# Patient Record
Sex: MALE | URBAN - METROPOLITAN AREA
[De-identification: names, ages, dates, MRNs, and addresses within clinical notes are randomized per-mention and may not be internally consistent; named-entity substitution may affect disease eponyms.]

---

## 2017-04-14 ENCOUNTER — IMPORTED ENCOUNTER (OUTPATIENT)
Dept: URBAN - METROPOLITAN AREA CLINIC 50 | Facility: CLINIC | Age: 72
End: 2017-04-14

## 2017-05-12 ENCOUNTER — IMPORTED ENCOUNTER (OUTPATIENT)
Dept: URBAN - METROPOLITAN AREA CLINIC 50 | Facility: CLINIC | Age: 72
End: 2017-05-12

## 2017-07-07 ENCOUNTER — IMPORTED ENCOUNTER (OUTPATIENT)
Dept: URBAN - METROPOLITAN AREA CLINIC 50 | Facility: CLINIC | Age: 72
End: 2017-07-07

## 2017-08-11 ENCOUNTER — IMPORTED ENCOUNTER (OUTPATIENT)
Dept: URBAN - METROPOLITAN AREA CLINIC 50 | Facility: CLINIC | Age: 72
End: 2017-08-11

## 2017-08-18 ENCOUNTER — IMPORTED ENCOUNTER (OUTPATIENT)
Dept: URBAN - METROPOLITAN AREA CLINIC 50 | Facility: CLINIC | Age: 72
End: 2017-08-18

## 2017-10-19 ENCOUNTER — IMPORTED ENCOUNTER (OUTPATIENT)
Dept: URBAN - METROPOLITAN AREA CLINIC 50 | Facility: CLINIC | Age: 72
End: 2017-10-19

## 2017-11-03 ENCOUNTER — IMPORTED ENCOUNTER (OUTPATIENT)
Dept: URBAN - METROPOLITAN AREA CLINIC 50 | Facility: CLINIC | Age: 72
End: 2017-11-03

## 2017-11-10 ENCOUNTER — IMPORTED ENCOUNTER (OUTPATIENT)
Dept: URBAN - METROPOLITAN AREA CLINIC 50 | Facility: CLINIC | Age: 72
End: 2017-11-10

## 2017-11-30 ENCOUNTER — IMPORTED ENCOUNTER (OUTPATIENT)
Dept: URBAN - METROPOLITAN AREA CLINIC 50 | Facility: CLINIC | Age: 72
End: 2017-11-30

## 2018-01-30 ENCOUNTER — IMPORTED ENCOUNTER (OUTPATIENT)
Dept: URBAN - METROPOLITAN AREA CLINIC 50 | Facility: CLINIC | Age: 73
End: 2018-01-30

## 2021-04-23 ASSESSMENT — TONOMETRY
OS_IOP_MMHG: 16
OS_IOP_MMHG: 16
OD_IOP_MMHG: 16
OD_IOP_MMHG: 18

## 2021-04-23 ASSESSMENT — VISUAL ACUITY
OS_CC: J1+@ 16 IN
OD_CC: J1+@ 16 IN
OS_CC: 20/25
OS_CC: 20/25+2
OS_SC: 20/30-
OD_CC: 20/20
OD_SC: 20/50
OD_CC: 20/25
OS_PH: 20/25
OS_SC: 20/30
OD_SC: 20/50
OD_PH: 20/30

## 2021-06-08 NOTE — PATIENT DISCUSSION
very visually significant cataract OD greater than OS along with retinopathy OU. Referred for cataract surgery consultation.  Patient lives in [facility].

## 2021-06-08 NOTE — PATIENT DISCUSSION
mission cataract MD consultation for opinion on management of very dense cataract and nonspecified diabetic retinopathy OU.

## 2021-06-08 NOTE — PATIENT DISCUSSION
attempted OCT  possible macular edema observed OS versus artifact, macular scan OD poor quality but more normal. poor scan quality from cataracts OU.

## 2021-07-16 NOTE — PATIENT DISCUSSION
PT SHOWS DIABETIC RETINOPATHY AND MACULAR EDEMA. PT IS NOT SEEING A RETINA SPECIALIST . CATARACT SURGERY INCREASES RISKS OF ADDITONAL SWELLING ECT  AND CONCERNED HE MAY NOT HAVE THE FUNDS TO BE FOLLOWED AND TREATED AFTER.  PT WILL  NEED RETINAL CLEARANCE PRIOR TO CAT SX.

## 2021-07-16 NOTE — PATIENT DISCUSSION
Larissa Lazaro is a 77year old female who presents for a Medicare Annual Wellness visit.     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 days, 10mg po for 3 days, Disp: 18 tablet Rfl: 0   Albuterol Sulfate HFA (PROAIR HFA) 108 (90 BASE) MCG/ACT Inhalation Aero Soln Inhale 2 puffs into the lungs every 4 (four) hours as needed for Wheezing or Shortness of Breath (cough).  Disp: 1 Inhaler Rfl: Walks    How would you describe your daily physical activity?: Moderate    How would you describe your current health state?: Good    How do you maintain positive mental well-being?: Puzzles;Games; Visiting Friends; Visiting Family; Social Interaction    If y Assessment\" under Medicare Assessment section in Charting, test patient and document. Then, refresh your progress note to see your input here.   Cognitive Assessment     What day of the week is this?: Correct    What month is it?: Correct    What year i Mammogram  Annually Mammogram,1 Yr due on 12/18/2016 Update Health Maintenance if applicable   Immunizations      Influenza No orders found for this or any previous visit.  Update Immunization Activity if applicable    Pneumococcal   Orders placed or per No results found for this or any previous visit. No flowsheet data found.         ALLERGIES:     Benadryl [Diphenhyd*        Comment:Rapid heart rate  Heparin                     Comment:Blood clot  Penicillins             Hives    CURRENT MEDICATIONS: Smokeless tobacco: Never Used                      Alcohol use:  No              Occ: retired : y       REVIEW OF SYSTEMS:   GENERAL: feels well otherwise  SKIN: denies any unusual skin lesions  EYES: denies blurred vision or double vaccination  -     FLU VACCINE ADJUVANT IM    Essential hypertension- controlled, cpm    Pure hypercholesterolemia- check labs today, on atorvastatin    Status post resection of meningioma/seizure disorder-  f/u neurosurgeon from [place], on keppra      H/

## 2021-07-27 NOTE — PATIENT DISCUSSION
Long discussion with patient, will require long term care, recommend patient be seen at [facility] for retina care or [facility], information given to patient.  Stressed importance of follow up care.

## 2021-07-27 NOTE — PATIENT DISCUSSION
Discussed the importance of blood pressure control in the prevention of ocular complications.  Stressed importance of follow up care.

## 2021-08-31 NOTE — PROCEDURE NOTE: CLINICAL
PROCEDURE NOTE: Lucentis 0.3mg Sample OD. Diagnosis: Diabetic Macular Edema. Anesthesia: Topical. Prep: Betadine Drops and Betadine Scrub. The patient was identified visually and verbally by the surgeon. The procedure eye was marked with an adhesive arrow sticker. The risks, benefits, alternatives and possible complications of the procedure were discussed with the patient and informed consent was obtained. The patient was positioned in the chair. After numerous topical anesthetic drops were placed, subconjunctival lidocaine was administered. A speculum was used to hold the eyelid open. A caliper was used to marked the site of injection 3.5-4mm from the limbus. Betadine was placed on the site with a swab and allowed to sit for thirty seconds. A sterile 30 or 31 guage needle with the medication entered the vitreous cavity and the medication was administered. The speculum was removed. The eye was copiously rinsed with saline to remove all betadine, especially from the fornices. Gross vision was assessed. If the patient wished an antibiotic ointment and eye patch were applied. The patient was instructed to call immediately if any significant visual loss, swelling, discharge, or pain occurred. Intravitreal injection of Lucentis 0.3mg/0.05 ml was given. Patient tolerated the procedure well. There were no complications. CF vision checked. Post procedure instructions given. Nareshbubba Jeannette PROCEDURE NOTE: Avastin () OS. Diagnosis: Diabetic Macular Edema. Anesthesia: Proparacaine 0.5%. Prep: Betadine Drops and Betadine Scrub. Prior to the original injection, risks/benefits/alternatives discussed including infection, loss of vision, hemorrhage, cataract, glaucoma, retinal tears or detachment. A written consent is on file, and the need for today’s injection was discussed and the patient is understanding and wishes to proceed. The off-label status of Intravitreal Avastin also was reviewed. The patient wished to proceed with treatment. The patient wished to proceed with treatment. Topical anesthetic drops were applied to the eye. Betadine prep was performed. Surgical mask worn. Sterile drape and lid speculum were applied. Using the syringe provided, Avastin 1.25 mg in 0.05 cc was injected into the vitreous cavity. Injection site: 3-4 mm from the limbus. Patient tolerated procedure well. Following the intravitreal injection, the sterile lid speculum was removed. CRA perfusion confirmed. CF vision checked. Patient given office phone number/answering service number and advised to call immediately should there be an increase in floaters or redness, loss of vision or pain, or should they have any other questions or concerns. Angelita Machado

## 2021-09-10 NOTE — PROCEDURE NOTE: CLINICAL
PROCEDURE NOTE: PRP OD. Diagnosis: Diabetic Macular Edema. Anesthesia: Topical. Prior to PRP, risks/benefits/alternatives to laser discussed including loss of vision, decreased peripheral and night vision and patient wished to proceed. Spot size: 100 um. Power: 600 mW. Number of pulses: 125. Patient tolerated procedure well. There were no complications. Post procedure instructions given. Neelima Quinteros

## 2021-09-28 NOTE — PROCEDURE NOTE: CLINICAL
PROCEDURE NOTE: Avastin () OD. Diagnosis: Diabetic Macular Edema. Anesthesia: Proparacaine 0.5%. Prep: Betadine Drops and Betadine Scrub. Prior to the original injection, risks/benefits/alternatives discussed including infection, loss of vision, hemorrhage, cataract, glaucoma, retinal tears or detachment. A written consent is on file, and the need for today’s injection was discussed and the patient is understanding and wishes to proceed. The off-label status of Intravitreal Avastin also was reviewed. The patient wished to proceed with treatment. The patient wished to proceed with treatment. Topical anesthetic drops were applied to the eye. Betadine prep was performed. Surgical mask worn. Sterile drape and lid speculum were applied. Using the syringe provided, Avastin 1.25 mg in 0.05 cc was injected into the vitreous cavity. Injection site: 3-4 mm from the limbus. Patient tolerated procedure well. Following the intravitreal injection, the sterile lid speculum was removed. CRA perfusion confirmed. CF vision checked. Patient given office phone number/answering service number and advised to call immediately should there be an increase in floaters or redness, loss of vision or pain, or should they have any other questions or concerns. [facility] PROCEDURE NOTE: Avastin () OS. Diagnosis: Diabetic Macular Edema. Anesthesia: Proparacaine 0.5%. Prep: Betadine Drops and Betadine Scrub. Prior to the original injection, risks/benefits/alternatives discussed including infection, loss of vision, hemorrhage, cataract, glaucoma, retinal tears or detachment. A written consent is on file, and the need for today’s injection was discussed and the patient is understanding and wishes to proceed. The off-label status of Intravitreal Avastin also was reviewed. The patient wished to proceed with treatment. The patient wished to proceed with treatment. Topical anesthetic drops were applied to the eye. Betadine prep was performed. Surgical mask worn. Sterile drape and lid speculum were applied. Using the syringe provided, Avastin 1.25 mg in 0.05 cc was injected into the vitreous cavity. Injection site: 3-4 mm from the limbus. Patient tolerated procedure well. Following the intravitreal injection, the sterile lid speculum was removed. CRA perfusion confirmed. CF vision checked. Patient given office phone number/answering service number and advised to call immediately should there be an increase in floaters or redness, loss of vision or pain, or should they have any other questions or concerns. Waqas Lutz

## 2021-10-05 NOTE — PATIENT DISCUSSION
The patient feels that the cataract is significantly impacting daily activities and has elected cataract surgery. The risks, benefits, and alternatives to surgery were discussed. The patient elects to proceed with surgery. Pt understands that he has limited visual potential due to retinal issueS. Will contact.

## 2021-10-05 NOTE — PATIENT DISCUSSION
PT HAS CONTACTED [facility] [phone] SHERYL SCHMIDT AND THEY WILL COVER ALL COSTS OF CATARACT SURGERY, INCLUDING PO'S AND NEW GLASSES.

## 2021-10-18 NOTE — PATIENT DISCUSSION
PT HAS CONTACTED [facility] [phone] SHERYL SCHMIDT AND THEY WILL COVER ALL COSTS OF CATARACT SURGERY, INCLUDING PO'S AND NEW GLASSES.

## 2021-10-18 NOTE — PATIENT DISCUSSION
PT HAS CONTACTED [facility] [phone] SHERYL SCHMIDT AND THEY WILL COVER ALL COSTS OF CATARACT SURGERY, INCLUDING PO'S AND NEW GLASSES.

## 2021-10-19 NOTE — PATIENT DISCUSSION
Cataract surgery has been performed in the first eye and activities of daily living are still impaired. The patient would like to proceed with cataract surgery in the second eye as scheduled. The patient elects BV OS, goal of emmetropia.

## 2021-10-19 NOTE — PATIENT DISCUSSION
PT HAS CONTACTED [facility] [phone] SHERYL SCHMIDT AND THEY WILL COVER ALL COSTS OF CATARACT SURGERY, INCLUDING PO'S AND NEW GLASSES.

## 2021-10-25 NOTE — PATIENT DISCUSSION
1week PO: Patient is doing well post-operatively. The importance of post-op drop compliance was emphasized. Drop schedule reviewed with patient. Patient to call if any visual changes or concerns.

## 2021-10-25 NOTE — PATIENT DISCUSSION
PT HAS CONTACTED [facility] [phone] SHERYL SCHMIDT AND THEY WILL COVER ALL COSTS OF CATARACT SURGERY, INCLUDING PO'S AND NEW GLASSES.

## 2021-10-25 NOTE — PATIENT DISCUSSION
PT HAS CONTACTED [facility] [phone] SHERYL SCHMIDT AND THEY WILL COVER ALL COSTS OF CATARACT SURGERY, INCLUDING PO'S AND NEW GLASSES.

## 2021-10-26 NOTE — PATIENT DISCUSSION
PT HAS CONTACTED [facility] [phone] SHERYL SCHMIDT AND THEY WILL COVER ALL COSTS OF CATARACT SURGERY, INCLUDING PO'S AND NEW GLASSES.

## 2021-12-02 NOTE — PATIENT DISCUSSION
PT HAS CONTACTED [facility] [phone] SHERYL SCHMIDT AND THEY WILL COVER ALL COSTS OF CATARACT SURGERY, INCLUDING PO'S AND NEW GLASSES.

## 2022-01-05 NOTE — PROCEDURE NOTE: CLINICAL
PROCEDURE NOTE: Avastin () OD. Diagnosis: Diabetic Macular Edema. Anesthesia: Proparacaine 0.5%. Prep: Betadine Drops and Betadine Scrub. Prior to the original injection, risks/benefits/alternatives discussed including infection, loss of vision, hemorrhage, cataract, glaucoma, retinal tears or detachment. A written consent is on file, and the need for today’s injection was discussed and the patient is understanding and wishes to proceed. The off-label status of Intravitreal Avastin also was reviewed. The patient wished to proceed with treatment. The patient wished to proceed with treatment. Topical anesthetic drops were applied to the eye. Betadine prep was performed. Surgical mask worn. Sterile drape and lid speculum were applied. Using the syringe provided, Avastin 1.25 mg in 0.05 cc was injected into the vitreous cavity. Injection site: 3-4 mm from the limbus. Patient tolerated procedure well. Following the intravitreal injection, the sterile lid speculum was removed. CRA perfusion confirmed. CF vision checked. Patient given office phone number/answering service number and advised to call immediately should there be an increase in floaters or redness, loss of vision or pain, or should they have any other questions or concerns. Andriy Nathan PROCEDURE NOTE: Avastin () OS. Diagnosis: Diabetic Macular Edema. Anesthesia: Proparacaine 0.5%. Prep: Betadine Drops and Betadine Scrub. Prior to the original injection, risks/benefits/alternatives discussed including infection, loss of vision, hemorrhage, cataract, glaucoma, retinal tears or detachment. A written consent is on file, and the need for today’s injection was discussed and the patient is understanding and wishes to proceed. The off-label status of Intravitreal Avastin also was reviewed. The patient wished to proceed with treatment. The patient wished to proceed with treatment. Topical anesthetic drops were applied to the eye. Betadine prep was performed. Surgical mask worn. Sterile drape and lid speculum were applied. Using the syringe provided, Avastin 1.25 mg in 0.05 cc was injected into the vitreous cavity. Injection site: 3-4 mm from the limbus. Patient tolerated procedure well. Following the intravitreal injection, the sterile lid speculum was removed. CRA perfusion confirmed. CF vision checked. Patient given office phone number/answering service number and advised to call immediately should there be an increase in floaters or redness, loss of vision or pain, or should they have any other questions or concerns. Andriy Evans

## 2022-01-05 NOTE — PATIENT DISCUSSION
PT HAS CONTACTED [facility] [phone] SHERYL SCHMIDT AND THEY WILL COVER ALL COSTS OF CATARACT SURGERY, INCLUDING PO'S AND NEW GLASSES.

## 2022-02-08 NOTE — PROCEDURE NOTE: CLINICAL
PROCEDURE NOTE: Avastin () OD. Diagnosis: Diabetic Macular Edema. Anesthesia: Proparacaine 0.5%. Prep: Betadine Drops and Betadine Scrub. Prior to the original injection, risks/benefits/alternatives discussed including infection, loss of vision, hemorrhage, cataract, glaucoma, retinal tears or detachment. A written consent is on file, and the need for today’s injection was discussed and the patient is understanding and wishes to proceed. The off-label status of Intravitreal Avastin also was reviewed. The patient wished to proceed with treatment. The patient wished to proceed with treatment. Topical anesthetic drops were applied to the eye. Betadine prep was performed. Surgical mask worn. Sterile drape and lid speculum were applied. Using the syringe provided, Avastin 1.25 mg in 0.05 cc was injected into the vitreous cavity. Injection site: 3-4 mm from the limbus. Patient tolerated procedure well. Following the intravitreal injection, the sterile lid speculum was removed. CRA perfusion confirmed. CF vision checked. Patient given office phone number/answering service number and advised to call immediately should there be an increase in floaters or redness, loss of vision or pain, or should they have any other questions or concerns. [facility] PROCEDURE NOTE: Avastin () OS. Diagnosis: Diabetic Macular Edema. Anesthesia: Proparacaine 0.5%. Prep: Betadine Drops and Betadine Scrub. Prior to the original injection, risks/benefits/alternatives discussed including infection, loss of vision, hemorrhage, cataract, glaucoma, retinal tears or detachment. A written consent is on file, and the need for today’s injection was discussed and the patient is understanding and wishes to proceed. The off-label status of Intravitreal Avastin also was reviewed. The patient wished to proceed with treatment. The patient wished to proceed with treatment. Topical anesthetic drops were applied to the eye. Betadine prep was performed. Surgical mask worn. Sterile drape and lid speculum were applied. Using the syringe provided, Avastin 1.25 mg in 0.05 cc was injected into the vitreous cavity. Injection site: 3-4 mm from the limbus. Patient tolerated procedure well. Following the intravitreal injection, the sterile lid speculum was removed. CRA perfusion confirmed. CF vision checked. Patient given office phone number/answering service number and advised to call immediately should there be an increase in floaters or redness, loss of vision or pain, or should they have any other questions or concerns. Waqas Lutz

## 2022-02-08 NOTE — PATIENT DISCUSSION
PT HAS CONTACTED [facility] [phone] SHERYL SCHMIDT AND THEY WILL COVER ALL COSTS OF CATARACT SURGERY, INCLUDING PO'S AND NEW GLASSES.

## 2022-03-15 NOTE — PROCEDURE NOTE: CLINICAL
PROCEDURE NOTE: Avastin () OD. Diagnosis: Diabetic Macular Edema. Anesthesia: 2% Lidocaine. Prep: Betadine Drops and Betadine Scrub. Prior to the original injection, risks/benefits/alternatives discussed including infection, loss of vision, hemorrhage, cataract, glaucoma, retinal tears or detachment. A written consent is on file, and the need for today’s injection was discussed and the patient is understanding and wishes to proceed. The off-label status of Intravitreal Avastin also was reviewed. The patient wished to proceed with treatment. The patient wished to proceed with treatment. Topical anesthetic drops were applied to the eye. Betadine prep was performed. Surgical mask worn. Sterile drape and lid speculum were applied. Using the syringe provided, Avastin 1.25 mg in 0.05 cc was injected into the vitreous cavity. Injection site: 3-4 mm from the limbus. Patient tolerated procedure well. Following the intravitreal injection, the sterile lid speculum was removed. CRA perfusion confirmed. CF vision checked. Patient given office phone number/answering service number and advised to call immediately should there be an increase in floaters or redness, loss of vision or pain, or should they have any other questions or concerns. Prosper Guallpa PROCEDURE NOTE: Avastin () OS. Diagnosis: Diabetic Macular Edema. Anesthesia: 2% Lidocaine. Prep: Betadine Drops and Betadine Scrub. Prior to the original injection, risks/benefits/alternatives discussed including infection, loss of vision, hemorrhage, cataract, glaucoma, retinal tears or detachment. A written consent is on file, and the need for today’s injection was discussed and the patient is understanding and wishes to proceed. The off-label status of Intravitreal Avastin also was reviewed. The patient wished to proceed with treatment. The patient wished to proceed with treatment. Topical anesthetic drops were applied to the eye. Betadine prep was performed. Surgical mask worn. Sterile drape and lid speculum were applied. Using the syringe provided, Avastin 1.25 mg in 0.05 cc was injected into the vitreous cavity. Injection site: 3-4 mm from the limbus. Patient tolerated procedure well. Following the intravitreal injection, the sterile lid speculum was removed. CRA perfusion confirmed. CF vision checked. Patient given office phone number/answering service number and advised to call immediately should there be an increase in floaters or redness, loss of vision or pain, or should they have any other questions or concerns. Prosper Guallpa

## 2022-03-15 NOTE — PATIENT DISCUSSION
PT HAS CONTACTED [facility] [phone] SHERYL SCHMIDT AND THEY WILL COVER ALL COSTS OF CATARACT SURGERY, INCLUDING PO'S AND NEW GLASSES.

## 2023-05-12 NOTE — PATIENT DISCUSSION
Pt's daughter states her dad is In Patient at [facility] & has been there since 5/8/23 She states she has been told his problems are coming from his heart. She states she has a lot of questions & is asking to speak a Cardiologist.     Since he is In Patient, I advised her to let his Nurse know she would like to speak to to the doctor on call for [facility]. She agreed to discuss with his Nurse. Symptoms of retinal detachment and endophthalmitis following intravitreal injection discussed; patient advised to call immediately if symptoms ensue.

## 2025-01-29 NOTE — PATIENT DISCUSSION
Educational brochures given to patient. Pt informed Nurse during PAT call that she currently has a UTI and will be started on antibiotics.. Informed Dr. Alarcon office ([place]) of above whom states she will let Dr. Alarcon aware.